# Patient Record
Sex: FEMALE
[De-identification: names, ages, dates, MRNs, and addresses within clinical notes are randomized per-mention and may not be internally consistent; named-entity substitution may affect disease eponyms.]

---

## 2022-06-10 ENCOUNTER — DASHBOARD ENCOUNTERS (OUTPATIENT)
Age: 57
End: 2022-06-10

## 2022-06-13 ENCOUNTER — OFFICE VISIT (OUTPATIENT)
Dept: URBAN - METROPOLITAN AREA CLINIC 80 | Facility: CLINIC | Age: 57
End: 2022-06-13
Payer: COMMERCIAL

## 2022-06-13 VITALS
HEIGHT: 66 IN | BODY MASS INDEX: 26.2 KG/M2 | SYSTOLIC BLOOD PRESSURE: 112 MMHG | TEMPERATURE: 98.2 F | HEART RATE: 64 BPM | DIASTOLIC BLOOD PRESSURE: 70 MMHG | WEIGHT: 163 LBS

## 2022-06-13 DIAGNOSIS — Z86.010 HISTORY OF COLON POLYPS: ICD-10-CM

## 2022-06-13 DIAGNOSIS — L28.0 LICHEN: ICD-10-CM

## 2022-06-13 PROCEDURE — 99243 OFF/OP CNSLTJ NEW/EST LOW 30: CPT | Performed by: INTERNAL MEDICINE

## 2022-06-13 PROCEDURE — 99203 OFFICE O/P NEW LOW 30 MIN: CPT | Performed by: INTERNAL MEDICINE

## 2022-06-13 RX ORDER — TRIAMCINOLONE ACETONIDE 1 MG/G
CREAM TOPICAL
Qty: 454 GRAM | Status: ACTIVE | COMMUNITY

## 2022-06-13 RX ORDER — SODIUM SULFATE, MAGNESIUM SULFATE, AND POTASSIUM CHLORIDE 17.75; 2.7; 2.25 G/1; G/1; G/1
12 TABLETS TABLET ORAL
Qty: 24 TABLETS | Refills: 0 | OUTPATIENT
Start: 2022-06-13 | End: 2022-06-14

## 2022-06-13 RX ORDER — TRAMADOL HYDROCHLORIDE 50 MG/1
TABLET ORAL
Qty: 20 TABLET | Status: ACTIVE | COMMUNITY

## 2022-06-13 RX ORDER — HYDROXYZINE HYDROCHLORIDE 25 MG/1
TABLET ORAL
Qty: 30 TABLET | Status: ACTIVE | COMMUNITY

## 2022-06-13 RX ORDER — PREDNISONE 10 MG/1
TABLET ORAL
Qty: 23 TABLET | Status: ACTIVE | COMMUNITY

## 2022-06-13 RX ORDER — LEVOCETIRIZINE DIHYDROCHLORIDE 5 MG/1
TABLET ORAL
Qty: 30 TABLET | Status: ACTIVE | COMMUNITY

## 2022-06-13 RX ORDER — CLOBETASOL PROPIONATE 0.5 MG/G
OINTMENT TOPICAL
Qty: 45 GRAM | Status: ACTIVE | COMMUNITY

## 2022-06-13 NOTE — HPI-TODAY'S VISIT:
pt referred by Lorie Dixon NP for hx of colon polyps and recent diagnosis of perianal lichen planus a copy of this note will be sent to referring physician.  pt notes prior colonoscopy 3-5 yrs ago with polyps removed she does not recall repeat interval she recently went to her gynecologist and was diagnosed with perianal and vaginal lichen planus tx with steroid cream wich some relief she also had laser therapy for treatment notes normal stools with no diarrhea or constipation notes occ hematochezia most recently yesterday no abd pain no n/v normal appetite stable weight  no fam hx of colon cancer or colon polyps dad with duodenal cancer and t cell lymphoma.  he is passed now

## 2022-06-16 PROBLEM — 428283002 HISTORY OF POLYP OF COLON: Status: ACTIVE | Noted: 2022-06-13

## 2022-07-11 ENCOUNTER — OFFICE VISIT (OUTPATIENT)
Dept: URBAN - METROPOLITAN AREA MEDICAL CENTER 18 | Facility: MEDICAL CENTER | Age: 57
End: 2022-07-11
Payer: COMMERCIAL

## 2022-07-11 DIAGNOSIS — D12.0 ADENOMA OF CECUM: ICD-10-CM

## 2022-07-11 DIAGNOSIS — Z86.010 ADENOMAS PERSONAL HISTORY OF COLONIC POLYPS: ICD-10-CM

## 2022-07-11 DIAGNOSIS — D12.5 ADENOMA OF SIGMOID COLON: ICD-10-CM

## 2022-07-11 PROCEDURE — 45385 COLONOSCOPY W/LESION REMOVAL: CPT | Performed by: INTERNAL MEDICINE
